# Patient Record
Sex: FEMALE | Race: WHITE | NOT HISPANIC OR LATINO
[De-identification: names, ages, dates, MRNs, and addresses within clinical notes are randomized per-mention and may not be internally consistent; named-entity substitution may affect disease eponyms.]

---

## 2022-03-14 PROBLEM — Z00.00 ENCOUNTER FOR PREVENTIVE HEALTH EXAMINATION: Status: ACTIVE | Noted: 2022-03-14

## 2022-04-27 ENCOUNTER — APPOINTMENT (OUTPATIENT)
Dept: RHEUMATOLOGY | Facility: CLINIC | Age: 27
End: 2022-04-27
Payer: COMMERCIAL

## 2022-04-27 ENCOUNTER — NON-APPOINTMENT (OUTPATIENT)
Age: 27
End: 2022-04-27

## 2022-04-27 VITALS
HEART RATE: 65 BPM | BODY MASS INDEX: 22.58 KG/M2 | HEIGHT: 60 IN | SYSTOLIC BLOOD PRESSURE: 105 MMHG | TEMPERATURE: 97.2 F | DIASTOLIC BLOOD PRESSURE: 71 MMHG | OXYGEN SATURATION: 100 % | WEIGHT: 115 LBS

## 2022-04-27 PROCEDURE — 36415 COLL VENOUS BLD VENIPUNCTURE: CPT

## 2022-04-27 PROCEDURE — 99214 OFFICE O/P EST MOD 30 MIN: CPT | Mod: 25

## 2022-04-27 PROCEDURE — 99204 OFFICE O/P NEW MOD 45 MIN: CPT | Mod: 25

## 2022-04-27 RX ORDER — TIZANIDINE HYDROCHLORIDE 2 MG/1
2 CAPSULE ORAL
Qty: 90 | Refills: 0 | Status: ACTIVE | COMMUNITY
Start: 2022-04-27 | End: 1900-01-01
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
## 2022-04-28 ENCOUNTER — TRANSCRIPTION ENCOUNTER (OUTPATIENT)
Age: 27
End: 2022-04-28

## 2022-04-28 LAB
25(OH)D3 SERPL-MCNC: 23.4 NG/ML
ALBUMIN SERPL ELPH-MCNC: 4.9 G/DL
ALP BLD-CCNC: 62 U/L
ALT SERPL-CCNC: 10 U/L
ANION GAP SERPL CALC-SCNC: 11 MMOL/L
AST SERPL-CCNC: 17 U/L
BASOPHILS # BLD AUTO: 0.07 K/UL
BASOPHILS NFR BLD AUTO: 0.9 %
BILIRUB SERPL-MCNC: 0.3 MG/DL
BUN SERPL-MCNC: 15 MG/DL
C3 SERPL-MCNC: 96 MG/DL
C4 SERPL-MCNC: 26 MG/DL
CALCIUM SERPL-MCNC: 9.2 MG/DL
CHLORIDE SERPL-SCNC: 105 MMOL/L
CO2 SERPL-SCNC: 23 MMOL/L
CREAT SERPL-MCNC: 0.6 MG/DL
CRP SERPL-MCNC: <3 MG/L
EGFR: 125 ML/MIN/1.73M2
ENA RNP AB SER IA-ACNC: <0.2 AL
ENA SM AB SER IA-ACNC: <0.2 AL
ENA SS-A AB SER IA-ACNC: <0.2 AL
ENA SS-B AB SER IA-ACNC: <0.2 AL
EOSINOPHIL # BLD AUTO: 0.13 K/UL
EOSINOPHIL NFR BLD AUTO: 1.6 %
ERYTHROCYTE [SEDIMENTATION RATE] IN BLOOD BY WESTERGREN METHOD: 9 MM/HR
GLUCOSE SERPL-MCNC: 91 MG/DL
HCT VFR BLD CALC: 39.8 %
HETEROPH AB SER QL: NEGATIVE
HGB BLD-MCNC: 12.8 G/DL
IMM GRANULOCYTES NFR BLD AUTO: 0.2 %
LYMPHOCYTES # BLD AUTO: 2.47 K/UL
LYMPHOCYTES NFR BLD AUTO: 30.3 %
MAN DIFF?: NORMAL
MCHC RBC-ENTMCNC: 30.2 PG
MCHC RBC-ENTMCNC: 32.2 GM/DL
MCV RBC AUTO: 93.9 FL
MONOCYTES # BLD AUTO: 0.46 K/UL
MONOCYTES NFR BLD AUTO: 5.7 %
NEUTROPHILS # BLD AUTO: 4.99 K/UL
NEUTROPHILS NFR BLD AUTO: 61.3 %
PLATELET # BLD AUTO: 355 K/UL
POTASSIUM SERPL-SCNC: 4.4 MMOL/L
PROT SERPL-MCNC: 7.1 G/DL
RBC # BLD: 4.24 M/UL
RBC # FLD: 12.7 %
RHEUMATOID FACT SER QL: <10 IU/ML
SODIUM SERPL-SCNC: 140 MMOL/L
THYROGLOB AB SERPL-ACNC: <20 IU/ML
THYROPEROXIDASE AB SERPL IA-ACNC: 2164 IU/ML
TSH SERPL-ACNC: 0.97 UIU/ML
WBC # FLD AUTO: 8.14 K/UL

## 2022-04-29 LAB
CCP AB SER IA-ACNC: <8 UNITS
RF+CCP IGG SER-IMP: NEGATIVE

## 2022-05-02 LAB
A PHAGOCYTOPH IGG TITR SER IF: NORMAL TITER
ANA PAT FLD IF-IMP: ABNORMAL
ANA SER IF-ACNC: ABNORMAL
B BURGDOR AB SER QL IA: NEGATIVE
B MICROTI IGG TITR SER: NORMAL TITER
DSDNA AB SER-ACNC: <12 IU/ML
E CHAFFEENSIS IGG TITR SER IF: NORMAL TITER

## 2022-06-27 ENCOUNTER — APPOINTMENT (OUTPATIENT)
Dept: RHEUMATOLOGY | Facility: CLINIC | Age: 27
End: 2022-06-27

## 2022-06-27 VITALS
DIASTOLIC BLOOD PRESSURE: 65 MMHG | HEART RATE: 75 BPM | BODY MASS INDEX: 22.58 KG/M2 | WEIGHT: 115 LBS | OXYGEN SATURATION: 100 % | HEIGHT: 60 IN | TEMPERATURE: 97.5 F | SYSTOLIC BLOOD PRESSURE: 93 MMHG

## 2022-06-27 DIAGNOSIS — G56.91 UNSPECIFIED MONONEUROPATHY OF RIGHT UPPER LIMB: ICD-10-CM

## 2022-06-27 PROCEDURE — 99213 OFFICE O/P EST LOW 20 MIN: CPT
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
## 2022-12-27 ENCOUNTER — APPOINTMENT (OUTPATIENT)
Dept: RHEUMATOLOGY | Facility: CLINIC | Age: 27
End: 2022-12-27
Payer: COMMERCIAL

## 2022-12-27 VITALS
TEMPERATURE: 97.5 F | WEIGHT: 122 LBS | HEART RATE: 67 BPM | HEIGHT: 60 IN | SYSTOLIC BLOOD PRESSURE: 112 MMHG | OXYGEN SATURATION: 100 % | BODY MASS INDEX: 23.95 KG/M2 | RESPIRATION RATE: 18 BRPM | DIASTOLIC BLOOD PRESSURE: 76 MMHG

## 2022-12-27 DIAGNOSIS — R53.82 CHRONIC FATIGUE, UNSPECIFIED: ICD-10-CM

## 2022-12-27 PROCEDURE — 99214 OFFICE O/P EST MOD 30 MIN: CPT
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
## 2023-05-13 ENCOUNTER — NON-APPOINTMENT (OUTPATIENT)
Age: 28
End: 2023-05-13

## 2023-10-03 ENCOUNTER — NON-APPOINTMENT (OUTPATIENT)
Age: 28
End: 2023-10-03

## 2023-10-23 ENCOUNTER — NON-APPOINTMENT (OUTPATIENT)
Age: 28
End: 2023-10-23

## 2023-10-24 ENCOUNTER — EMERGENCY (EMERGENCY)
Facility: HOSPITAL | Age: 28
LOS: 1 days | Discharge: HOME | End: 2023-10-24
Attending: EMERGENCY MEDICINE
Payer: COMMERCIAL

## 2023-10-24 VITALS
TEMPERATURE: 98 F | HEART RATE: 88 BPM | RESPIRATION RATE: 18 BRPM | SYSTOLIC BLOOD PRESSURE: 110 MMHG | OXYGEN SATURATION: 100 % | DIASTOLIC BLOOD PRESSURE: 76 MMHG | WEIGHT: 139.99 LBS

## 2023-10-24 LAB
ALBUMIN SERPL ELPH-MCNC: 4.2 G/DL — SIGNIFICANT CHANGE UP (ref 3.3–5.2)
ALBUMIN SERPL ELPH-MCNC: 4.2 G/DL — SIGNIFICANT CHANGE UP (ref 3.3–5.2)
ALP SERPL-CCNC: 68 U/L — SIGNIFICANT CHANGE UP (ref 40–120)
ALP SERPL-CCNC: 68 U/L — SIGNIFICANT CHANGE UP (ref 40–120)
ALT FLD-CCNC: 14 U/L — SIGNIFICANT CHANGE UP
ALT FLD-CCNC: 14 U/L — SIGNIFICANT CHANGE UP
ANION GAP SERPL CALC-SCNC: 14 MMOL/L — SIGNIFICANT CHANGE UP (ref 5–17)
ANION GAP SERPL CALC-SCNC: 14 MMOL/L — SIGNIFICANT CHANGE UP (ref 5–17)
APPEARANCE UR: ABNORMAL
APPEARANCE UR: ABNORMAL
AST SERPL-CCNC: 20 U/L — SIGNIFICANT CHANGE UP
AST SERPL-CCNC: 20 U/L — SIGNIFICANT CHANGE UP
BACTERIA # UR AUTO: ABNORMAL
BACTERIA # UR AUTO: ABNORMAL
BASOPHILS # BLD AUTO: 0.04 K/UL — SIGNIFICANT CHANGE UP (ref 0–0.2)
BASOPHILS # BLD AUTO: 0.04 K/UL — SIGNIFICANT CHANGE UP (ref 0–0.2)
BASOPHILS NFR BLD AUTO: 0.4 % — SIGNIFICANT CHANGE UP (ref 0–2)
BASOPHILS NFR BLD AUTO: 0.4 % — SIGNIFICANT CHANGE UP (ref 0–2)
BILIRUB SERPL-MCNC: 0.4 MG/DL — SIGNIFICANT CHANGE UP (ref 0.4–2)
BILIRUB SERPL-MCNC: 0.4 MG/DL — SIGNIFICANT CHANGE UP (ref 0.4–2)
BILIRUB UR-MCNC: NEGATIVE — SIGNIFICANT CHANGE UP
BILIRUB UR-MCNC: NEGATIVE — SIGNIFICANT CHANGE UP
BUN SERPL-MCNC: 13.2 MG/DL — SIGNIFICANT CHANGE UP (ref 8–20)
BUN SERPL-MCNC: 13.2 MG/DL — SIGNIFICANT CHANGE UP (ref 8–20)
CALCIUM SERPL-MCNC: 9 MG/DL — SIGNIFICANT CHANGE UP (ref 8.4–10.5)
CALCIUM SERPL-MCNC: 9 MG/DL — SIGNIFICANT CHANGE UP (ref 8.4–10.5)
CHLORIDE SERPL-SCNC: 101 MMOL/L — SIGNIFICANT CHANGE UP (ref 96–108)
CHLORIDE SERPL-SCNC: 101 MMOL/L — SIGNIFICANT CHANGE UP (ref 96–108)
CO2 SERPL-SCNC: 23 MMOL/L — SIGNIFICANT CHANGE UP (ref 22–29)
CO2 SERPL-SCNC: 23 MMOL/L — SIGNIFICANT CHANGE UP (ref 22–29)
COD CRY URNS QL: ABNORMAL
COD CRY URNS QL: ABNORMAL
COLOR SPEC: YELLOW — SIGNIFICANT CHANGE UP
COLOR SPEC: YELLOW — SIGNIFICANT CHANGE UP
CREAT SERPL-MCNC: 0.72 MG/DL — SIGNIFICANT CHANGE UP (ref 0.5–1.3)
CREAT SERPL-MCNC: 0.72 MG/DL — SIGNIFICANT CHANGE UP (ref 0.5–1.3)
DIFF PNL FLD: NEGATIVE — SIGNIFICANT CHANGE UP
DIFF PNL FLD: NEGATIVE — SIGNIFICANT CHANGE UP
EGFR: 115 ML/MIN/1.73M2 — SIGNIFICANT CHANGE UP
EGFR: 115 ML/MIN/1.73M2 — SIGNIFICANT CHANGE UP
EOSINOPHIL # BLD AUTO: 0.3 K/UL — SIGNIFICANT CHANGE UP (ref 0–0.5)
EOSINOPHIL # BLD AUTO: 0.3 K/UL — SIGNIFICANT CHANGE UP (ref 0–0.5)
EOSINOPHIL NFR BLD AUTO: 3.2 % — SIGNIFICANT CHANGE UP (ref 0–6)
EOSINOPHIL NFR BLD AUTO: 3.2 % — SIGNIFICANT CHANGE UP (ref 0–6)
EPI CELLS # UR: SIGNIFICANT CHANGE UP
EPI CELLS # UR: SIGNIFICANT CHANGE UP
GLUCOSE SERPL-MCNC: 87 MG/DL — SIGNIFICANT CHANGE UP (ref 70–99)
GLUCOSE SERPL-MCNC: 87 MG/DL — SIGNIFICANT CHANGE UP (ref 70–99)
GLUCOSE UR QL: NEGATIVE MG/DL — SIGNIFICANT CHANGE UP
GLUCOSE UR QL: NEGATIVE MG/DL — SIGNIFICANT CHANGE UP
HCG SERPL-ACNC: <4 MIU/ML — SIGNIFICANT CHANGE UP
HCG SERPL-ACNC: <4 MIU/ML — SIGNIFICANT CHANGE UP
HCT VFR BLD CALC: 37.8 % — SIGNIFICANT CHANGE UP (ref 34.5–45)
HCT VFR BLD CALC: 37.8 % — SIGNIFICANT CHANGE UP (ref 34.5–45)
HGB BLD-MCNC: 12.6 G/DL — SIGNIFICANT CHANGE UP (ref 11.5–15.5)
HGB BLD-MCNC: 12.6 G/DL — SIGNIFICANT CHANGE UP (ref 11.5–15.5)
IMM GRANULOCYTES NFR BLD AUTO: 0.2 % — SIGNIFICANT CHANGE UP (ref 0–0.9)
IMM GRANULOCYTES NFR BLD AUTO: 0.2 % — SIGNIFICANT CHANGE UP (ref 0–0.9)
KETONES UR-MCNC: ABNORMAL
KETONES UR-MCNC: ABNORMAL
LEUKOCYTE ESTERASE UR-ACNC: ABNORMAL
LEUKOCYTE ESTERASE UR-ACNC: ABNORMAL
LIDOCAIN IGE QN: 39 U/L — SIGNIFICANT CHANGE UP (ref 22–51)
LIDOCAIN IGE QN: 39 U/L — SIGNIFICANT CHANGE UP (ref 22–51)
LYMPHOCYTES # BLD AUTO: 2.84 K/UL — SIGNIFICANT CHANGE UP (ref 1–3.3)
LYMPHOCYTES # BLD AUTO: 2.84 K/UL — SIGNIFICANT CHANGE UP (ref 1–3.3)
LYMPHOCYTES # BLD AUTO: 30.7 % — SIGNIFICANT CHANGE UP (ref 13–44)
LYMPHOCYTES # BLD AUTO: 30.7 % — SIGNIFICANT CHANGE UP (ref 13–44)
MCHC RBC-ENTMCNC: 31 PG — SIGNIFICANT CHANGE UP (ref 27–34)
MCHC RBC-ENTMCNC: 31 PG — SIGNIFICANT CHANGE UP (ref 27–34)
MCHC RBC-ENTMCNC: 33.3 GM/DL — SIGNIFICANT CHANGE UP (ref 32–36)
MCHC RBC-ENTMCNC: 33.3 GM/DL — SIGNIFICANT CHANGE UP (ref 32–36)
MCV RBC AUTO: 93.1 FL — SIGNIFICANT CHANGE UP (ref 80–100)
MCV RBC AUTO: 93.1 FL — SIGNIFICANT CHANGE UP (ref 80–100)
MONOCYTES # BLD AUTO: 0.54 K/UL — SIGNIFICANT CHANGE UP (ref 0–0.9)
MONOCYTES # BLD AUTO: 0.54 K/UL — SIGNIFICANT CHANGE UP (ref 0–0.9)
MONOCYTES NFR BLD AUTO: 5.8 % — SIGNIFICANT CHANGE UP (ref 2–14)
MONOCYTES NFR BLD AUTO: 5.8 % — SIGNIFICANT CHANGE UP (ref 2–14)
NEUTROPHILS # BLD AUTO: 5.51 K/UL — SIGNIFICANT CHANGE UP (ref 1.8–7.4)
NEUTROPHILS # BLD AUTO: 5.51 K/UL — SIGNIFICANT CHANGE UP (ref 1.8–7.4)
NEUTROPHILS NFR BLD AUTO: 59.7 % — SIGNIFICANT CHANGE UP (ref 43–77)
NEUTROPHILS NFR BLD AUTO: 59.7 % — SIGNIFICANT CHANGE UP (ref 43–77)
NITRITE UR-MCNC: NEGATIVE — SIGNIFICANT CHANGE UP
NITRITE UR-MCNC: NEGATIVE — SIGNIFICANT CHANGE UP
PH UR: 6 — SIGNIFICANT CHANGE UP (ref 5–8)
PH UR: 6 — SIGNIFICANT CHANGE UP (ref 5–8)
PLATELET # BLD AUTO: 340 K/UL — SIGNIFICANT CHANGE UP (ref 150–400)
PLATELET # BLD AUTO: 340 K/UL — SIGNIFICANT CHANGE UP (ref 150–400)
POTASSIUM SERPL-MCNC: 4.2 MMOL/L — SIGNIFICANT CHANGE UP (ref 3.5–5.3)
POTASSIUM SERPL-MCNC: 4.2 MMOL/L — SIGNIFICANT CHANGE UP (ref 3.5–5.3)
POTASSIUM SERPL-SCNC: 4.2 MMOL/L — SIGNIFICANT CHANGE UP (ref 3.5–5.3)
POTASSIUM SERPL-SCNC: 4.2 MMOL/L — SIGNIFICANT CHANGE UP (ref 3.5–5.3)
PROT SERPL-MCNC: 6.7 G/DL — SIGNIFICANT CHANGE UP (ref 6.6–8.7)
PROT SERPL-MCNC: 6.7 G/DL — SIGNIFICANT CHANGE UP (ref 6.6–8.7)
PROT UR-MCNC: 30 MG/DL
PROT UR-MCNC: 30 MG/DL
RBC # BLD: 4.06 M/UL — SIGNIFICANT CHANGE UP (ref 3.8–5.2)
RBC # BLD: 4.06 M/UL — SIGNIFICANT CHANGE UP (ref 3.8–5.2)
RBC # FLD: 11.8 % — SIGNIFICANT CHANGE UP (ref 10.3–14.5)
RBC # FLD: 11.8 % — SIGNIFICANT CHANGE UP (ref 10.3–14.5)
RBC CASTS # UR COMP ASSIST: SIGNIFICANT CHANGE UP /HPF (ref 0–4)
RBC CASTS # UR COMP ASSIST: SIGNIFICANT CHANGE UP /HPF (ref 0–4)
SODIUM SERPL-SCNC: 138 MMOL/L — SIGNIFICANT CHANGE UP (ref 135–145)
SODIUM SERPL-SCNC: 138 MMOL/L — SIGNIFICANT CHANGE UP (ref 135–145)
SP GR SPEC: 1.02 — SIGNIFICANT CHANGE UP (ref 1.01–1.02)
SP GR SPEC: 1.02 — SIGNIFICANT CHANGE UP (ref 1.01–1.02)
UROBILINOGEN FLD QL: 1 MG/DL
UROBILINOGEN FLD QL: 1 MG/DL
WBC # BLD: 9.25 K/UL — SIGNIFICANT CHANGE UP (ref 3.8–10.5)
WBC # BLD: 9.25 K/UL — SIGNIFICANT CHANGE UP (ref 3.8–10.5)
WBC # FLD AUTO: 9.25 K/UL — SIGNIFICANT CHANGE UP (ref 3.8–10.5)
WBC # FLD AUTO: 9.25 K/UL — SIGNIFICANT CHANGE UP (ref 3.8–10.5)
WBC UR QL: SIGNIFICANT CHANGE UP /HPF (ref 0–5)
WBC UR QL: SIGNIFICANT CHANGE UP /HPF (ref 0–5)

## 2023-10-24 PROCEDURE — 99285 EMERGENCY DEPT VISIT HI MDM: CPT

## 2023-10-24 PROCEDURE — 76705 ECHO EXAM OF ABDOMEN: CPT | Mod: 26

## 2023-10-24 NOTE — ED PROVIDER NOTE - TEMPLATE, MLM
General Spironolactone Counseling: Patient advised regarding risks of diarrhea, abdominal pain, hyperkalemia, birth defects (for female patients), liver toxicity and renal toxicity. The patient may need blood work to monitor liver and kidney function and potassium levels while on therapy. The patient verbalized understanding of the proper use and possible adverse effects of spironolactone.  All of the patient's questions and concerns were addressed.

## 2023-10-24 NOTE — ED PROVIDER NOTE - PHYSICAL EXAMINATION
Gen: Nontoxic, well appearing, in NAD.  Skin: Warm and dry as visualized.  Head: NC/AT.  Eyes: PERRLA. EOMI.  Neck: Supple, FROM. Trachea midline.   Resp: No distress.  Cardio: Well perfused.  Abd: Nondistended. Soft. RUQ/RLQ tenderness. No guarding.   Ext: No deformities. MAEx4. FROM.   Neuro: A&Ox3. Appears nonfocal.   Psych: Normal affect and mood.

## 2023-10-24 NOTE — ED PROVIDER NOTE - PATIENT PORTAL LINK FT
You can access the FollowMyHealth Patient Portal offered by [facility] by registering at the following website: [email or web address]. By joining [employer]’s FollowMyHealth portal, you will also be able to view your health information using other applications (apps) compatible with our system.

## 2023-10-24 NOTE — ED PROVIDER NOTE - PROGRESS NOTE DETAILS
JEAN Blankenship: Patient evaluated by intake physician. HPI/ROS/PE as noted above. Will follow up plan per intake physician and continue to assess patient.

## 2023-10-24 NOTE — ED PROVIDER NOTE - OBJECTIVE STATEMENT
29 yo female PMHx Hashimoto's, hemochromatosis, Gluten intolerant presents to ED c/o multiple medical complaints. Reports over the last 2 weeks waking up with upper abdominal pain and after heavy meals. Reports loss of appetite. Intermittent diarrhea/constipation. Presented to UC today, instructed to present to ED. Mother had GERD. No further complaints at this time.   Denies vomiting, urinary sxms.

## 2023-10-24 NOTE — ED ADULT TRIAGE NOTE - MODE OF ARRIVAL
Walk in If you are a smoker, it is important for your health to stop smoking. Please be aware that second hand smoke is also harmful.

## 2023-10-24 NOTE — ED ADULT NURSE NOTE - OBJECTIVE STATEMENT
Assumed care of patient in consult a3. Pt a&ox4 rr even and unlabored with no pmhx presents to ed with one week of "on and off" abdominal pain/cramping, diarrhea and constipation. Pt denies chest pain, sob, fever, chills, numbness/tingling. pt educated on plan of care, pt able to successfully teach back plan of care to RN, RN will continue to reeducate pt during hospital stay.

## 2023-10-24 NOTE — ED PROVIDER NOTE - NSFOLLOWUPINSTRUCTIONS_ED_ALL_ED_FT
- Increase fluids.  - [place] diet as tolerated. Avoid citrus based food/drink, spicy/greasy foods, milk, caffeine.   - Please call today to schedule follow up appointment with gastroenterologist.     - If you need assistance in scheduling a follow up appointment, please call the bolded telephone number on the first page of your discharge instructions.   - Please call to schedule follow up appointment with your primary care physician within 24-48 hours.  - Please seek immediate medical attention for any new/worsening, signs/symptoms, or concerns.    Feel better!      Acute Abdominal Pain    WHAT YOU NEED TO KNOW:    What do I need to know about acute abdominal pain? Acute abdominal pain usually starts suddenly and gets worse quickly.     What are minor causes of acute abdominal pain?   •An allergic reaction to food, or food poisoning      •Stress      •Acid reflux      •Constipation      •Monthly period pain in females      What are serious causes of acute abdominal pain?   •Inflammation or rupture of your appendix      •Swelling or an infection in your abdomen or organ      •A blockage in your bowels      •An ulcer or a tear in your esophagus, stomach, or intestines      •Bleeding in your abdomen or an organ      •Stones in your kidney or gallbladder      •Diseases of the fallopian tubes or ovaries      •An ectopic pregnancy      How is the cause of acute abdominal pain diagnosed? Your healthcare provider will ask about your signs and symptoms. Tell the provider when your symptoms started and about any recent travel or surgery. Also tell him what makes the pain better or worse, and what treatments you have tried. The provider will examine you. Based on what your provider finds from the exam, and your symptoms, you may need other tests. Examples include blood or urine tests, an ultrasound, a CT scan, or an endoscopy.     How is acute abdominal pain treated? Treatment may depend on the cause of your abdominal pain. You may need any of the following:   •Medicines may be given to decrease pain, treat an infection, and manage your symptoms, such as constipation.       •Surgery may be needed to treat a serious cause of abdominal pain. Examples include surgery to treat appendicitis or a blockage in your bowels.       How can I manage my symptoms?   •Apply heat on your abdomen for 20 to 30 minutes every 2 hours for as many days as directed. Heat helps decrease pain and muscle spasms.       •Make changes to the food you eat as directed. Do not eat foods that cause abdominal pain or other symptoms. Eat small meals more often. ?Eat more high-fiber foods if you are constipated. High-fiber foods include fruits, vegetables, whole-grain foods, and legumes.       ?Do not eat foods that cause gas if you have bloating. Examples include broccoli, cabbage, and cauliflower. Do not drink soda or carbonated drinks, because these may also cause gas.       ?Do not eat foods or drinks that contain sorbitol or fructose if you have diarrhea and bloating. Some examples are fruit juices, candy, jelly, and sugar-free gum.       ?Do not eat high-fat foods, such as fried foods, cheeseburgers, hot dogs, and desserts.      ?Limit or do not drink caffeine. Caffeine may make symptoms, such as heart burn or nausea, worse.       ?Drink plenty of liquids to prevent dehydration from diarrhea or vomiting. Ask your healthcare provider how much liquid to drink each day and which liquids are best for you.       •Manage your stress. Stress may cause abdominal pain. Your healthcare provider may recommend relaxation techniques and deep breathing exercises to help decrease your stress. Your healthcare provider may recommend you talk to someone about your stress or anxiety, such as a counselor or a trusted friend. Get plenty of sleep and exercise regularly.       •Limit or do not drink alcohol. Alcohol can make your abdominal pain worse. Ask your healthcare provider if it is safe for you to drink alcohol. Also ask how much is safe for you to drink.       •Do not smoke. Nicotine and other chemicals in cigarettes can damage your esophagus and stomach. Ask your healthcare provider for information if you currently smoke and need help to quit. E-cigarettes or smokeless tobacco still contain nicotine. Talk to your healthcare provider before you use these products.       When should I seek immediate care?   •You vomit blood or cannot stop vomiting.      •You have blood in your bowel movement or it looks like tar.       •You have bleeding from your rectum.       •Your abdomen is larger than usual, more painful, and hard.       •You have severe pain in your abdomen.       •You stop passing gas and having bowel movements.       •You feel weak, dizzy, or faint.      When should I contact my healthcare provider?   •You have a fever.      •You have new signs and symptoms.      •Your symptoms do not get better with treatment.       •You have questions or concerns about your condition or care.      CARE AGREEMENT:    You have the right to help plan your care. Learn about your health condition and how it may be treated. Discuss treatment options with your healthcare providers to decide what care you want to receive. You always have the right to refuse treatment.

## 2023-10-24 NOTE — ED PROVIDER NOTE - NS ED ATTENDING STATEMENT MOD
This was a shared visit with the IAN. I reviewed and verified the documentation and independently performed the documented:

## 2023-10-24 NOTE — ED PROVIDER NOTE - CARE PROVIDER_API CALL
Nicholas Castillo  Gastroenterology  [address]  [place] [address]  Phone: [phone]  Fax: [phone]  Follow Up Time:

## 2023-10-24 NOTE — ED PROVIDER NOTE - CLINICAL SUMMARY MEDICAL DECISION MAKING FREE TEXT BOX
27 yo female PMHx Hashimoto's, hemochromatosis, Gluten intolerant presents to ED c/o abdominal pain, intermittent diarrhea/constipation. No abnormalities on labs. No concerning findings on US/CU. Encouraged GI f/u. Medically stable for discharge.

## 2023-10-24 NOTE — ED PROVIDER NOTE - CARE PROVIDERS DIRECT ADDRESSES
[email or web address]

## 2023-10-24 NOTE — ED PROVIDER NOTE - NSPTACCESSSVCSAPPT_ED_ALL_ED
Cardiac clearance letter faxed and scanned into chart. Called pt to hold ASA 7 days prior to procedure and to resume as soon as safely possible . Pt v/u. Specialty Care (Routine)...

## 2023-10-25 VITALS
TEMPERATURE: 98 F | RESPIRATION RATE: 18 BRPM | SYSTOLIC BLOOD PRESSURE: 105 MMHG | OXYGEN SATURATION: 98 % | HEART RATE: 90 BPM | DIASTOLIC BLOOD PRESSURE: 71 MMHG

## 2023-10-25 PROCEDURE — 74177 CT ABD & PELVIS W/CONTRAST: CPT | Mod: MA

## 2023-10-25 PROCEDURE — 84702 CHORIONIC GONADOTROPIN TEST: CPT

## 2023-10-25 PROCEDURE — 85025 COMPLETE CBC W/AUTO DIFF WBC: CPT

## 2023-10-25 PROCEDURE — 83690 ASSAY OF LIPASE: CPT

## 2023-10-25 PROCEDURE — 81001 URINALYSIS AUTO W/SCOPE: CPT

## 2023-10-25 PROCEDURE — 87086 URINE CULTURE/COLONY COUNT: CPT

## 2023-10-25 PROCEDURE — 80053 COMPREHEN METABOLIC PANEL: CPT

## 2023-10-25 PROCEDURE — 76705 ECHO EXAM OF ABDOMEN: CPT

## 2023-10-25 PROCEDURE — 74177 CT ABD & PELVIS W/CONTRAST: CPT | Mod: 26,MA

## 2023-10-25 PROCEDURE — 99284 EMERGENCY DEPT VISIT MOD MDM: CPT | Mod: 25

## 2023-10-25 PROCEDURE — 36415 COLL VENOUS BLD VENIPUNCTURE: CPT

## 2023-10-26 LAB
CULTURE RESULTS: SIGNIFICANT CHANGE UP
CULTURE RESULTS: SIGNIFICANT CHANGE UP
SPECIMEN SOURCE: SIGNIFICANT CHANGE UP
SPECIMEN SOURCE: SIGNIFICANT CHANGE UP

## 2023-10-28 ENCOUNTER — NON-APPOINTMENT (OUTPATIENT)
Age: 28
End: 2023-10-28

## 2023-12-17 ENCOUNTER — NON-APPOINTMENT (OUTPATIENT)
Age: 28
End: 2023-12-17

## 2024-01-31 ENCOUNTER — APPOINTMENT (OUTPATIENT)
Dept: GASTROENTEROLOGY | Facility: CLINIC | Age: 29
End: 2024-01-31

## 2024-05-10 DIAGNOSIS — G89.29 OTHER CHRONIC PAIN: ICD-10-CM

## 2025-04-10 ENCOUNTER — EMERGENCY (EMERGENCY)
Facility: HOSPITAL | Age: 30
LOS: 1 days | End: 2025-04-10
Attending: EMERGENCY MEDICINE
Payer: COMMERCIAL

## 2025-04-10 VITALS
HEART RATE: 81 BPM | SYSTOLIC BLOOD PRESSURE: 133 MMHG | DIASTOLIC BLOOD PRESSURE: 86 MMHG | HEIGHT: 64 IN | OXYGEN SATURATION: 100 % | TEMPERATURE: 97 F | WEIGHT: 143.96 LBS | RESPIRATION RATE: 20 BRPM

## 2025-04-10 VITALS
SYSTOLIC BLOOD PRESSURE: 103 MMHG | DIASTOLIC BLOOD PRESSURE: 60 MMHG | TEMPERATURE: 98 F | RESPIRATION RATE: 18 BRPM | OXYGEN SATURATION: 98 % | HEART RATE: 85 BPM

## 2025-04-10 LAB
ALBUMIN SERPL ELPH-MCNC: 4.1 G/DL — SIGNIFICANT CHANGE UP (ref 3.3–5.2)
ALP SERPL-CCNC: 78 U/L — SIGNIFICANT CHANGE UP (ref 40–120)
ALT FLD-CCNC: 9 U/L — SIGNIFICANT CHANGE UP
ANION GAP SERPL CALC-SCNC: 14 MMOL/L — SIGNIFICANT CHANGE UP (ref 5–17)
APPEARANCE UR: CLEAR — SIGNIFICANT CHANGE UP
AST SERPL-CCNC: 13 U/L — SIGNIFICANT CHANGE UP
BACTERIA # UR AUTO: ABNORMAL /HPF
BASOPHILS # BLD AUTO: 0.05 K/UL — SIGNIFICANT CHANGE UP (ref 0–0.2)
BASOPHILS NFR BLD AUTO: 0.3 % — SIGNIFICANT CHANGE UP (ref 0–2)
BILIRUB SERPL-MCNC: <0.2 MG/DL — LOW (ref 0.4–2)
BILIRUB UR-MCNC: NEGATIVE — SIGNIFICANT CHANGE UP
BLD GP AB SCN SERPL QL: SIGNIFICANT CHANGE UP
BUN SERPL-MCNC: 11.1 MG/DL — SIGNIFICANT CHANGE UP (ref 8–20)
CALCIUM SERPL-MCNC: 9 MG/DL — SIGNIFICANT CHANGE UP (ref 8.4–10.5)
CAST: 1 /LPF — SIGNIFICANT CHANGE UP (ref 0–4)
CHLORIDE SERPL-SCNC: 102 MMOL/L — SIGNIFICANT CHANGE UP (ref 96–108)
CO2 SERPL-SCNC: 22 MMOL/L — SIGNIFICANT CHANGE UP (ref 22–29)
COD CRY URNS QL: PRESENT
COLOR SPEC: YELLOW — SIGNIFICANT CHANGE UP
CREAT SERPL-MCNC: 0.67 MG/DL — SIGNIFICANT CHANGE UP (ref 0.5–1.3)
DIFF PNL FLD: NEGATIVE — SIGNIFICANT CHANGE UP
EGFR: 120 ML/MIN/1.73M2 — SIGNIFICANT CHANGE UP
EGFR: 120 ML/MIN/1.73M2 — SIGNIFICANT CHANGE UP
EOSINOPHIL # BLD AUTO: 0.04 K/UL — SIGNIFICANT CHANGE UP (ref 0–0.5)
EOSINOPHIL NFR BLD AUTO: 0.3 % — SIGNIFICANT CHANGE UP (ref 0–6)
GLUCOSE SERPL-MCNC: 109 MG/DL — HIGH (ref 70–99)
GLUCOSE UR QL: NEGATIVE MG/DL — SIGNIFICANT CHANGE UP
HCG SERPL-ACNC: HIGH MIU/ML
HCT VFR BLD CALC: 38.6 % — SIGNIFICANT CHANGE UP (ref 34.5–45)
HGB BLD-MCNC: 13.1 G/DL — SIGNIFICANT CHANGE UP (ref 11.5–15.5)
IMM GRANULOCYTES # BLD AUTO: 0.06 K/UL — SIGNIFICANT CHANGE UP (ref 0–0.07)
IMM GRANULOCYTES NFR BLD AUTO: 0.4 % — SIGNIFICANT CHANGE UP (ref 0–0.9)
KETONES UR-MCNC: ABNORMAL MG/DL
LEUKOCYTE ESTERASE UR-ACNC: NEGATIVE — SIGNIFICANT CHANGE UP
LYMPHOCYTES # BLD AUTO: 1.77 K/UL — SIGNIFICANT CHANGE UP (ref 1–3.3)
LYMPHOCYTES NFR BLD AUTO: 11.3 % — LOW (ref 13–44)
MCHC RBC-ENTMCNC: 31.2 PG — SIGNIFICANT CHANGE UP (ref 27–34)
MCHC RBC-ENTMCNC: 33.9 G/DL — SIGNIFICANT CHANGE UP (ref 32–36)
MCV RBC AUTO: 91.9 FL — SIGNIFICANT CHANGE UP (ref 80–100)
MONOCYTES # BLD AUTO: 0.69 K/UL — SIGNIFICANT CHANGE UP (ref 0–0.9)
MONOCYTES NFR BLD AUTO: 4.4 % — SIGNIFICANT CHANGE UP (ref 2–14)
NEUTROPHILS # BLD AUTO: 13.09 K/UL — HIGH (ref 1.8–7.4)
NEUTROPHILS NFR BLD AUTO: 83.3 % — HIGH (ref 43–77)
NITRITE UR-MCNC: NEGATIVE — SIGNIFICANT CHANGE UP
NRBC # BLD AUTO: 0 K/UL — SIGNIFICANT CHANGE UP (ref 0–0)
NRBC # FLD: 0 K/UL — SIGNIFICANT CHANGE UP (ref 0–0)
NRBC BLD AUTO-RTO: 0 /100 WBCS — SIGNIFICANT CHANGE UP (ref 0–0)
PH UR: 5.5 — SIGNIFICANT CHANGE UP (ref 5–8)
PLATELET # BLD AUTO: 331 K/UL — SIGNIFICANT CHANGE UP (ref 150–400)
PMV BLD: 10.8 FL — SIGNIFICANT CHANGE UP (ref 7–13)
POTASSIUM SERPL-MCNC: 3.7 MMOL/L — SIGNIFICANT CHANGE UP (ref 3.5–5.3)
POTASSIUM SERPL-SCNC: 3.7 MMOL/L — SIGNIFICANT CHANGE UP (ref 3.5–5.3)
PROT SERPL-MCNC: 7 G/DL — SIGNIFICANT CHANGE UP (ref 6.6–8.7)
PROT UR-MCNC: SIGNIFICANT CHANGE UP MG/DL
RBC # BLD: 4.2 M/UL — SIGNIFICANT CHANGE UP (ref 3.8–5.2)
RBC # FLD: 12.2 % — SIGNIFICANT CHANGE UP (ref 10.3–14.5)
RBC CASTS # UR COMP ASSIST: 3 /HPF — SIGNIFICANT CHANGE UP (ref 0–4)
SODIUM SERPL-SCNC: 138 MMOL/L — SIGNIFICANT CHANGE UP (ref 135–145)
SP GR SPEC: >1.03 — HIGH (ref 1–1.03)
SQUAMOUS # UR AUTO: 7 /HPF — HIGH (ref 0–5)
UROBILINOGEN FLD QL: 1 MG/DL — SIGNIFICANT CHANGE UP (ref 0.2–1)
WBC # BLD: 15.7 K/UL — HIGH (ref 3.8–10.5)
WBC # FLD AUTO: 15.7 K/UL — HIGH (ref 3.8–10.5)
WBC UR QL: 6 /HPF — HIGH (ref 0–5)

## 2025-04-10 PROCEDURE — 76817 TRANSVAGINAL US OBSTETRIC: CPT | Mod: 26

## 2025-04-10 PROCEDURE — 76801 OB US < 14 WKS SINGLE FETUS: CPT | Mod: 26

## 2025-04-10 PROCEDURE — 99285 EMERGENCY DEPT VISIT HI MDM: CPT

## 2025-04-10 RX ORDER — ACETAMINOPHEN 500 MG/5ML
1000 LIQUID (ML) ORAL ONCE
Refills: 0 | Status: COMPLETED | OUTPATIENT
Start: 2025-04-10 | End: 2025-04-10

## 2025-04-10 RX ORDER — KETOROLAC TROMETHAMINE 30 MG/ML
15 INJECTION, SOLUTION INTRAMUSCULAR; INTRAVENOUS ONCE
Refills: 0 | Status: DISCONTINUED | OUTPATIENT
Start: 2025-04-10 | End: 2025-04-10

## 2025-04-10 RX ORDER — ONDANSETRON HCL/PF 4 MG/2 ML
4 VIAL (ML) INJECTION ONCE
Refills: 0 | Status: ACTIVE | OUTPATIENT
Start: 2025-04-10 | End: 2025-04-10

## 2025-04-10 RX ADMIN — Medication 400 MILLIGRAM(S): at 20:54

## 2025-04-10 RX ADMIN — KETOROLAC TROMETHAMINE 15 MILLIGRAM(S): 30 INJECTION, SOLUTION INTRAMUSCULAR; INTRAVENOUS at 22:43

## 2025-04-10 NOTE — ED PROVIDER NOTE - ATTENDING APP SHARED VISIT CONTRIBUTION OF CARE
indep evalpregnant approx 2 months with unilateral pelvic pain  +ttp on pe  needs lab ua US   eval for possible ectopic  agree w evla an dmngt

## 2025-04-10 NOTE — ED ADULT NURSE NOTE - OBJECTIVE STATEMENT
Pt A&Ox4 c/o pelvic pain R sided sometimes b/l x today. + pregnancy test 2 days ago appt to see OB tomorrow. LMP 2/13 reports had some implantation bleeding, none now. Hx Hashimoto's on synthroid, upped dose by endocrinologist. Airway patent, respirations even and unlabored.

## 2025-04-10 NOTE — ED PROVIDER NOTE - PHYSICAL EXAMINATION
General: non-toxic appearing, in some acute discomfort, A+O  CV: RRR, nl s1/s2.  Resp: CTAB, normal rate and effort  GI: Abdomen soft, NT, ND  Skin: No rashes. intact and perfused.

## 2025-04-10 NOTE — ED PROVIDER NOTE - PROGRESS NOTE DETAILS
transported pt down to US at 0810pm received call from rad tech noting that US shows likely ectopic, pending official rad reading

## 2025-04-10 NOTE — ED PROVIDER NOTE - OBJECTIVE STATEMENT
30yo F  10w gest (LMP b 16), med hx of anxiety, hemochromatosis, hyperthyroidism, presents to ED c/o RLQ pain. pt notes she has been experiencing pain over last few days, however pain became more severe and more constant over last 3h. pt also reports nausea, which she has been experiencing throughout her pregnancy. has not taken meds for sx. pt notes he mother has hx of multiple miscarriages. no vaginal bleeding, vaginal discharge or leakage, vomiting, back pain, dysuria, hematuria, urinary freq/urgency

## 2025-04-10 NOTE — ED PROVIDER NOTE - PATIENT PORTAL LINK FT
You can access the FollowMyHealth Patient Portal offered by [facility] by registering at the following website: [email or web address]. By joining [employer]’s FollowMyHealth portal, you will also be able to view your health information using other applications (apps) compatible with our system.

## 2025-04-10 NOTE — ED PROVIDER NOTE - CLINICAL SUMMARY MEDICAL DECISION MAKING FREE TEXT BOX
28yo F 10w gest p/w progressive unilateral lower abdominal pain. no vaginal bleeding or leakage. on eval, pt appeared well, in some discomfort, abdomen benign, remainder of PE WNL. VSS. pt received priority US to r/o ectopic. pending rad reading, labs results, and urine results. ordered ofirmev for pain. called OB to advised likely ectopic 30yo F 10w gest p/w progressive unilateral lower abdominal pain. no vaginal bleeding or leakage. on eval, pt appeared well, in some discomfort, abdomen benign, remainder of PE WNL. VSS. pt received priority US to r/o ectopic. pending rad reading, labs results, and urine results. ordered ofirmev for pain. called OB to advised likely ectopic    US showed high suspicion for R tubal ectopic unruptured. HCG 12k. pt was evaluated by OB team. methotrexate ordered by OBGYN team. advised to f/u with ob. discussed supportive care measures and return precautions. pt verbalized understanding and agreement

## 2025-04-11 PROCEDURE — 80053 COMPREHEN METABOLIC PANEL: CPT

## 2025-04-11 PROCEDURE — 81001 URINALYSIS AUTO W/SCOPE: CPT

## 2025-04-11 PROCEDURE — 87086 URINE CULTURE/COLONY COUNT: CPT

## 2025-04-11 PROCEDURE — 86850 RBC ANTIBODY SCREEN: CPT

## 2025-04-11 PROCEDURE — 76817 TRANSVAGINAL US OBSTETRIC: CPT

## 2025-04-11 PROCEDURE — 76801 OB US < 14 WKS SINGLE FETUS: CPT

## 2025-04-11 PROCEDURE — 99284 EMERGENCY DEPT VISIT MOD MDM: CPT

## 2025-04-11 PROCEDURE — 86901 BLOOD TYPING SEROLOGIC RH(D): CPT

## 2025-04-11 PROCEDURE — 99284 EMERGENCY DEPT VISIT MOD MDM: CPT | Mod: 25

## 2025-04-11 PROCEDURE — 86900 BLOOD TYPING SEROLOGIC ABO: CPT

## 2025-04-11 PROCEDURE — 96375 TX/PRO/DX INJ NEW DRUG ADDON: CPT

## 2025-04-11 PROCEDURE — 84702 CHORIONIC GONADOTROPIN TEST: CPT

## 2025-04-11 PROCEDURE — 36415 COLL VENOUS BLD VENIPUNCTURE: CPT

## 2025-04-11 PROCEDURE — 96374 THER/PROPH/DIAG INJ IV PUSH: CPT

## 2025-04-11 PROCEDURE — 96372 THER/PROPH/DIAG INJ SC/IM: CPT | Mod: XU

## 2025-04-11 PROCEDURE — 85025 COMPLETE CBC W/AUTO DIFF WBC: CPT

## 2025-04-11 RX ORDER — METHOTREXATE 25 MG/ML
80 INJECTION, SOLUTION INTRA-ARTERIAL; INTRAMUSCULAR; INTRATHECAL; INTRAVENOUS ONCE
Refills: 0 | Status: COMPLETED | OUTPATIENT
Start: 2025-04-11 | End: 2025-04-11

## 2025-04-11 RX ORDER — METHOTREXATE 25 MG/ML
80 INJECTION, SOLUTION INTRA-ARTERIAL; INTRAMUSCULAR; INTRATHECAL; INTRAVENOUS
Refills: 0 | Status: DISCONTINUED | OUTPATIENT
Start: 2025-04-11 | End: 2025-04-11

## 2025-04-11 RX ADMIN — METHOTREXATE 80 MILLIGRAM(S): 25 INJECTION, SOLUTION INTRA-ARTERIAL; INTRAMUSCULAR; INTRATHECAL; INTRAVENOUS at 01:56

## 2025-04-11 NOTE — CONSULT NOTE ADULT - SUBJECTIVE AND OBJECTIVE BOX
RAMON MIRANDA is a 29y  at 3w2d who presented to the ED with a three day hx of abd cramping. She states that she took at-home pregnancy test which came back positive. The cramping kept increasing in severity until she  felt the need to come in to the ED today for pain control. She denies any vaginal bleeding, abnormal discharge or urinary sx. Patient endorsing mild nausea but no vomiting. She denies any other GI symptoms. No urinary symptoms. TVUS performed which confirmed the presence of a right tubal ectopic containing a gestational sac with no evidence of rupture at this time. No IUP present. Incidental finding of a left 3.9 cm hemorrhagic ovarian cyst. She is not currently endorsing any pain s/p Zofran, Ofirmev and Toradol given in the ED.     GYN provider: Dr Guerrier    LMP: 3/19/2025    POBHx:   PGYNHx: Denies uterine fibroids, STIs. Endorses hx of a left ovarian cyst that was roughly 2cm in diameter two years ago. Hx of abn PAPs but no hx of colpo or LEEP.   PMHx: Anxiety, Hashimoto's Thyroiditis, Asthma, Hereditary hemochromatosis  Meds: Lexapro, Leiothyronine, Levothyroxine, Singulair  All: Duricef - hives  PSHx: Tonsillectomy, Adenoidectomy  Social Hx: Denies    Physical Exam  T(C): 36.8 (04-10-25 @ 23:42), Max: 36.8 (04-10-25 @ 23:42)  HR: 85 (04-10-25 @ 23:42) (81 - 85)  BP: 103/60 (04-10-25 @ 23:42) (103/60 - 133/86)  RR: 18 (04-10-25 @ 23:42) (18 - 20)  SpO2: 98% (04-10-25 @ 23:42) (98% - 100%)  Gen: AAOx3, NAD  CVS: RRR, S1 and S2 noted.   Resp: CTA b/l  Abd: Nontender, nondistended.  No guarding, no rebound tenderness. No CVA tenderness  Ext: No swelling, redness or tenderness in the UE or LE b/l.   Pelvic: Deferred. No vaginal bleeding or abnormal vaginal discharge. Normal external genitalia.         Labs:                        13.1   15.70 )-----------( 331      ( 10 Apr 2025 20:55 )             38.6     04-10    138  |  102  |  11.1  ----------------------------<  109[H]  3.7   |  22.0  |  0.67    Ca    9.0      10 Apr 2025 20:55    TPro  7.0  /  Alb  4.1  /  TBili  <0.2[L]  /  DBili  x   /  AST  13  /  ALT  9   /  AlkPhos  78  04-10    HCG Quantitative, Serum: 43367.0 mIU/mL (04-10-25 @ 20:55)      Imaging:     LMP: 2025    Estimated Gestational Age by LMP: 7 weeks 4 days    COMPARISON: CT abdomen and pelvis dated 10/25/2023.    TECHNIQUE: Endovaginal and transabdominal pelvic sonogram. Color and   Spectral Doppler was performed.    FINDINGS:  Uterus: The uterus is unremarkable in appearance. The endometrium is   mildly heterogeneous measuring approximately 1.2 cm in AP thickness   without demonstrable intrauterine gestation.    Right ovary: 3.2 cm x 1.6 cm x 2.3 cm. Within normal limits. Normal   arterial and venous waveforms.  Left ovary: 3.9 cm x 3.9 cm x 4.0 cm. Within normal limits. The left   ovary contains a 3.4 x 3.6 x 3.9 cm hemorrhagic cyst with lacelike   internal septations.    Adnexa: There is a 2.2 x 2.3 x 2.3cm thick-walled echogenic structure   with central fluid adjacent to the right ovary with demonstrable   vascularity on spectral Doppler within the thick-walled component.    Fluid: Small line simple appearing free fluid posterior cul-de-sac.    IMPRESSION:  1. Findings highly suspicious for a right tubal ectopic pregnancy   containing a gestational sac. No evidence of rupture at this time.  2. No intrauterine gestation identified.  3. Incidental 3.9 cm hemorrhagic left ovarian cyst.

## 2025-04-11 NOTE — CONSULT NOTE ADULT - ASSESSMENT
A/P: RAMON MIRANDA is a 29y  at 3w2d who presented to the ED with a three day hx of abd cramping that worsened in severity. S/p Toradol, Ofirmev and Zofran in the ED with resolution of pain.     -VSS  -Abd: soft, non-tender.   -H/H: 13.1/38.6   -TVUS: 1. Findings highly suspicious for a 2.2 x 2.3 x 2.3cm right tubal ectopic pregnancy   containing a gestational sac. No evidence of rupture at this time.  2. No intrauterine gestation identified.  3. Incidental 3.9 cm hemorrhagic left ovarian cyst.  -[facility] [place]  -Blood type: AB negative     The patient was counseled extensively on expectant vs medical vs surgical management for ectopic pregnancy. Risks of expectant management were reviewed including tubal rupture, hemorrhage, emergency surgery, and death. Benefits of medical management were reviewed including avoiding  the inherent risks of surgery and anesthesia. Risks of medical management with methotrexate were reviewed including possible rupture of ectopic, possible need for re-dose, and possible need for subsequent surgery discussed. Contraindications were reviewed including absolute (Intrauterine pregnancy, Evidence of immunodeficiency, Moderate to severe anemia, leukopenia, or thrombocytopenia, Sensitivity to methotrexate, Active pulmonary disease, Active peptic ulcer disease, Clinically important hepatic dysfunction, Clinically important renal dysfunction, Breastfeeding, Ruptured ectopic pregnancy, Hemodynamically unstable patient, Inability to participate in follow-up) and relative (Embryonic cardiac activity detected by transvaginal ultrasonography, High initial hCG concentration, Ectopic pregnancy greater than 4 cm in size as imaged by TVUS, Refusal to accept blood transfusion) contraindications. Risks of methotrexate were reviewed including but not limited to GI problems (nausea, vomiting, stomatitis), vaginal spotting, abdominal pain/cramping, elevated liver enzymes, rare incidence of alopecia and pneumonitis. Medical management, compared with surgical management, has a 15-20% lower success rate and requires longer surveillance, more office visits, and phlebotomy. Risks of surgical management were reviewed including overall low risk of complications, most common complications are those related to general anesthesia, small risk of  injury to the bowel, bladder, or a major blood vessel, risk of thermal injury, bleeding from the incisions made in the skin and infection. Effects of future fertility were reviewed with the patient. Patient was counseled that evidence suggests that methotrexate treatment of ectopic pregnancy does not have an adverse effect on subsequent fertility or on ovarian reserve. Patient was also explained that if the other fallopian tube is healthy, a salpingectomy does not affect future fertility.    Patient elected for management with methotrexate. Consents for methotrexate signed with patient with attending at bedside. Chemotherapy drug order form sent  to pharmacy with methotrexate dose calculated based on BSA for patient. Patient was given strict precautions (severe abdominal pain, dizziness, lightheadedness, severe n/v, or any heavy vaginal bleeding >2pads/hour for >2hours) on when to return and educated about the symptoms of tubal rupture and to emphasize the need to seek immediate medical attention if these symptoms occur. Vigorous activity and sexual intercourse recommended to be avoided until confirmation of resolution because of the theoretical risk of inducing rupture of the ectopic pregnancy. Advised to avoid folic acid supplements, foods that contain folic acid, and nonsteroidal antiinflammatory drugs during therapy because these products may decrease the efficacy of methotrexate. Recommended avoidance of narcotic analgesic medications, alcohol, and gas-producing foods are recommended so as not to mask, or be confused with, escalation of symptoms of rupture. Sunlight exposure also should be avoided during treatment to limit the risk of methotrexate dermatitis. Pt understands she cannot attempt to conceive for at least 3 months. Patient instructed on need for follow up of b-hcg on day 4 and day 7 of methotrexate therapy. Once patient receives methotrexate therapy she is cleared for discharge from OBGYN perspective.  Primary management per ED team.

## 2025-04-11 NOTE — CONSULT NOTE ADULT - ATTENDING COMMENTS
- vital signs stable   - pain resolved   - abdominal exam benign   - discussed R/B of MTX vs surgery, discussed factors that could make MTX less likely to succeed (in her case HCG of 12k), discussed risk of rupture despite MTX management, discussed risk of needing repeat MTX management, she verbalized understanding and desire for MTX management   - discussed importance of follow up in office for Day 4 & 7 HCG levels, she is agreeable to comply, also has appt with GYN tomorrow AM   - discussed RTC pain precautions   - reviewed MTX side effects and precautions sheet and consent obtained   - order to be placed for MTX then discharge home   f/u with Dr. Guerrier tomorrow in office

## 2025-04-12 LAB
CULTURE RESULTS: SIGNIFICANT CHANGE UP
SPECIMEN SOURCE: SIGNIFICANT CHANGE UP

## 2025-04-13 ENCOUNTER — EMERGENCY (EMERGENCY)
Facility: HOSPITAL | Age: 30
LOS: 0 days | Discharge: HOME | End: 2025-04-13
Attending: EMERGENCY MEDICINE
Payer: COMMERCIAL

## 2025-04-13 VITALS
SYSTOLIC BLOOD PRESSURE: 132 MMHG | OXYGEN SATURATION: 98 % | RESPIRATION RATE: 18 BRPM | HEART RATE: 72 BPM | TEMPERATURE: 98 F | DIASTOLIC BLOOD PRESSURE: 77 MMHG

## 2025-04-13 VITALS — WEIGHT: 150.13 LBS | HEIGHT: 64 IN

## 2025-04-13 DIAGNOSIS — Z88.8 ALLERGY STATUS TO OTHER DRUGS, MEDICAMENTS AND BIOLOGICAL SUBSTANCES: ICD-10-CM

## 2025-04-13 DIAGNOSIS — O99.891 OTHER SPECIFIED DISEASES AND CONDITIONS COMPLICATING PREGNANCY: ICD-10-CM

## 2025-04-13 DIAGNOSIS — N83.201 UNSPECIFIED OVARIAN CYST, RIGHT SIDE: ICD-10-CM

## 2025-04-13 DIAGNOSIS — O99.341 OTHER MENTAL DISORDERS COMPLICATING PREGNANCY, FIRST TRIMESTER: ICD-10-CM

## 2025-04-13 DIAGNOSIS — R11.0 NAUSEA: ICD-10-CM

## 2025-04-13 DIAGNOSIS — O99.281 ENDOCRINE, NUTRITIONAL AND METABOLIC DISEASES COMPLICATING PREGNANCY, FIRST TRIMESTER: ICD-10-CM

## 2025-04-13 DIAGNOSIS — E05.90 THYROTOXICOSIS, UNSPECIFIED WITHOUT THYROTOXIC CRISIS OR STORM: ICD-10-CM

## 2025-04-13 DIAGNOSIS — E83.119 HEMOCHROMATOSIS, UNSPECIFIED: ICD-10-CM

## 2025-04-13 DIAGNOSIS — R10.31 RIGHT LOWER QUADRANT PAIN: ICD-10-CM

## 2025-04-13 DIAGNOSIS — O00.90 UNSPECIFIED ECTOPIC PREGNANCY WITHOUT INTRAUTERINE PREGNANCY: ICD-10-CM

## 2025-04-13 DIAGNOSIS — Z91.018 ALLERGY TO OTHER FOODS: ICD-10-CM

## 2025-04-13 LAB
ALBUMIN SERPL ELPH-MCNC: 3.5 G/DL — SIGNIFICANT CHANGE UP (ref 3.3–5)
ALP SERPL-CCNC: 79 U/L — SIGNIFICANT CHANGE UP (ref 40–120)
ALT FLD-CCNC: 20 U/L — SIGNIFICANT CHANGE UP (ref 12–78)
ANION GAP SERPL CALC-SCNC: 3 MMOL/L — LOW (ref 5–17)
APPEARANCE UR: ABNORMAL
APTT BLD: 30.8 SEC — SIGNIFICANT CHANGE UP (ref 24.5–35.6)
AST SERPL-CCNC: 21 U/L — SIGNIFICANT CHANGE UP (ref 15–37)
BACTERIA # UR AUTO: ABNORMAL /HPF
BASOPHILS # BLD AUTO: 0.04 K/UL — SIGNIFICANT CHANGE UP (ref 0–0.2)
BASOPHILS NFR BLD AUTO: 0.4 % — SIGNIFICANT CHANGE UP (ref 0–2)
BILIRUB SERPL-MCNC: 0.5 MG/DL — SIGNIFICANT CHANGE UP (ref 0.2–1.2)
BILIRUB UR-MCNC: NEGATIVE — SIGNIFICANT CHANGE UP
BLD GP AB SCN SERPL QL: SIGNIFICANT CHANGE UP
BUN SERPL-MCNC: 11 MG/DL — SIGNIFICANT CHANGE UP (ref 7–23)
CALCIUM SERPL-MCNC: 9.1 MG/DL — SIGNIFICANT CHANGE UP (ref 8.5–10.1)
CAST: 0 /LPF — SIGNIFICANT CHANGE UP (ref 0–4)
CHLORIDE SERPL-SCNC: 108 MMOL/L — SIGNIFICANT CHANGE UP (ref 96–108)
CO2 SERPL-SCNC: 26 MMOL/L — SIGNIFICANT CHANGE UP (ref 22–31)
COLOR SPEC: YELLOW — SIGNIFICANT CHANGE UP
COMMENT - URINE: SIGNIFICANT CHANGE UP
CREAT SERPL-MCNC: 1.03 MG/DL — SIGNIFICANT CHANGE UP (ref 0.5–1.3)
DIFF PNL FLD: NEGATIVE — SIGNIFICANT CHANGE UP
EGFR: 75 ML/MIN/1.73M2 — SIGNIFICANT CHANGE UP
EGFR: 75 ML/MIN/1.73M2 — SIGNIFICANT CHANGE UP
EOSINOPHIL # BLD AUTO: 0.1 K/UL — SIGNIFICANT CHANGE UP (ref 0–0.5)
EOSINOPHIL NFR BLD AUTO: 1 % — SIGNIFICANT CHANGE UP (ref 0–6)
EPI CELLS # UR: PRESENT
GLUCOSE SERPL-MCNC: 92 MG/DL — SIGNIFICANT CHANGE UP (ref 70–99)
GLUCOSE UR QL: NEGATIVE MG/DL — SIGNIFICANT CHANGE UP
HCG SERPL-ACNC: HIGH MIU/ML
HCT VFR BLD CALC: 36.6 % — SIGNIFICANT CHANGE UP (ref 34.5–45)
HGB BLD-MCNC: 12.4 G/DL — SIGNIFICANT CHANGE UP (ref 11.5–15.5)
IMM GRANULOCYTES # BLD AUTO: 0.03 K/UL — SIGNIFICANT CHANGE UP (ref 0–0.07)
IMM GRANULOCYTES NFR BLD AUTO: 0.3 % — SIGNIFICANT CHANGE UP (ref 0–0.9)
INR BLD: 0.94 RATIO — SIGNIFICANT CHANGE UP (ref 0.85–1.16)
KETONES UR-MCNC: ABNORMAL MG/DL
LEUKOCYTE ESTERASE UR-ACNC: ABNORMAL
LYMPHOCYTES # BLD AUTO: 1.68 K/UL — SIGNIFICANT CHANGE UP (ref 1–3.3)
LYMPHOCYTES NFR BLD AUTO: 16.9 % — SIGNIFICANT CHANGE UP (ref 13–44)
MCHC RBC-ENTMCNC: 31.2 PG — SIGNIFICANT CHANGE UP (ref 27–34)
MCHC RBC-ENTMCNC: 33.9 G/DL — SIGNIFICANT CHANGE UP (ref 32–36)
MCV RBC AUTO: 92 FL — SIGNIFICANT CHANGE UP (ref 80–100)
MONOCYTES # BLD AUTO: 0.26 K/UL — SIGNIFICANT CHANGE UP (ref 0–0.9)
MONOCYTES NFR BLD AUTO: 2.6 % — SIGNIFICANT CHANGE UP (ref 2–14)
NEUTROPHILS # BLD AUTO: 7.86 K/UL — HIGH (ref 1.8–7.4)
NEUTROPHILS NFR BLD AUTO: 78.8 % — HIGH (ref 43–77)
NITRITE UR-MCNC: NEGATIVE — SIGNIFICANT CHANGE UP
NRBC # BLD AUTO: 0 K/UL — SIGNIFICANT CHANGE UP (ref 0–0)
NRBC # FLD: 0 K/UL — SIGNIFICANT CHANGE UP (ref 0–0)
NRBC BLD AUTO-RTO: 0 /100 WBCS — SIGNIFICANT CHANGE UP (ref 0–0)
PH UR: 6.5 — SIGNIFICANT CHANGE UP (ref 5–8)
PLATELET # BLD AUTO: 264 K/UL — SIGNIFICANT CHANGE UP (ref 150–400)
PMV BLD: 10.8 FL — SIGNIFICANT CHANGE UP (ref 7–13)
POTASSIUM SERPL-MCNC: 4 MMOL/L — SIGNIFICANT CHANGE UP (ref 3.5–5.3)
POTASSIUM SERPL-SCNC: 4 MMOL/L — SIGNIFICANT CHANGE UP (ref 3.5–5.3)
PROT SERPL-MCNC: 7 GM/DL — SIGNIFICANT CHANGE UP (ref 6–8.3)
PROT UR-MCNC: NEGATIVE MG/DL — SIGNIFICANT CHANGE UP
PROTHROM AB SERPL-ACNC: 11.1 SEC — SIGNIFICANT CHANGE UP (ref 9.9–13.4)
RBC # BLD: 3.98 M/UL — SIGNIFICANT CHANGE UP (ref 3.8–5.2)
RBC # FLD: 12.2 % — SIGNIFICANT CHANGE UP (ref 10.3–14.5)
RBC CASTS # UR COMP ASSIST: 16 /HPF — HIGH (ref 0–4)
SODIUM SERPL-SCNC: 137 MMOL/L — SIGNIFICANT CHANGE UP (ref 135–145)
SP GR SPEC: 1.01 — SIGNIFICANT CHANGE UP (ref 1–1.03)
SQUAMOUS # UR AUTO: 20 /HPF — HIGH (ref 0–5)
UROBILINOGEN FLD QL: 0.2 MG/DL — SIGNIFICANT CHANGE UP (ref 0.2–1)
WBC # BLD: 9.97 K/UL — SIGNIFICANT CHANGE UP (ref 3.8–10.5)
WBC # FLD AUTO: 9.97 K/UL — SIGNIFICANT CHANGE UP (ref 3.8–10.5)
WBC UR QL: 2 /HPF — SIGNIFICANT CHANGE UP (ref 0–5)

## 2025-04-13 PROCEDURE — 93005 ELECTROCARDIOGRAM TRACING: CPT

## 2025-04-13 PROCEDURE — 96401 CHEMO ANTI-NEOPL SQ/IM: CPT

## 2025-04-13 PROCEDURE — 76830 TRANSVAGINAL US NON-OB: CPT | Mod: 26

## 2025-04-13 PROCEDURE — 80053 COMPREHEN METABOLIC PANEL: CPT

## 2025-04-13 PROCEDURE — 84702 CHORIONIC GONADOTROPIN TEST: CPT

## 2025-04-13 PROCEDURE — 81001 URINALYSIS AUTO W/SCOPE: CPT

## 2025-04-13 PROCEDURE — 93010 ELECTROCARDIOGRAM REPORT: CPT

## 2025-04-13 PROCEDURE — 85730 THROMBOPLASTIN TIME PARTIAL: CPT

## 2025-04-13 PROCEDURE — 86900 BLOOD TYPING SEROLOGIC ABO: CPT

## 2025-04-13 PROCEDURE — 99285 EMERGENCY DEPT VISIT HI MDM: CPT

## 2025-04-13 PROCEDURE — 86850 RBC ANTIBODY SCREEN: CPT

## 2025-04-13 PROCEDURE — 85610 PROTHROMBIN TIME: CPT

## 2025-04-13 PROCEDURE — 99285 EMERGENCY DEPT VISIT HI MDM: CPT | Mod: 25

## 2025-04-13 PROCEDURE — 76830 TRANSVAGINAL US NON-OB: CPT

## 2025-04-13 PROCEDURE — 85025 COMPLETE CBC W/AUTO DIFF WBC: CPT

## 2025-04-13 PROCEDURE — 96374 THER/PROPH/DIAG INJ IV PUSH: CPT

## 2025-04-13 PROCEDURE — 86901 BLOOD TYPING SEROLOGIC RH(D): CPT

## 2025-04-13 PROCEDURE — 36415 COLL VENOUS BLD VENIPUNCTURE: CPT

## 2025-04-13 RX ORDER — METHOTREXATE 25 MG/ML
86 INJECTION, SOLUTION INTRA-ARTERIAL; INTRAMUSCULAR; INTRATHECAL; INTRAVENOUS ONCE
Refills: 0 | Status: COMPLETED | OUTPATIENT
Start: 2025-04-13 | End: 2025-04-13

## 2025-04-13 RX ORDER — ACETAMINOPHEN 500 MG/5ML
1000 LIQUID (ML) ORAL ONCE
Refills: 0 | Status: COMPLETED | OUTPATIENT
Start: 2025-04-13 | End: 2025-04-13

## 2025-04-13 RX ORDER — METHOTREXATE 25 MG/ML
85 INJECTION, SOLUTION INTRA-ARTERIAL; INTRAMUSCULAR; INTRATHECAL; INTRAVENOUS ONCE
Refills: 0 | Status: DISCONTINUED | OUTPATIENT
Start: 2025-04-13 | End: 2025-04-13

## 2025-04-13 RX ADMIN — Medication 400 MILLIGRAM(S): at 08:40

## 2025-04-13 RX ADMIN — Medication 1000 MILLILITER(S): at 08:29

## 2025-04-13 RX ADMIN — METHOTREXATE 86 MILLIGRAM(S): 25 INJECTION, SOLUTION INTRA-ARTERIAL; INTRAMUSCULAR; INTRATHECAL; INTRAVENOUS at 13:31

## 2025-04-13 NOTE — ED PROVIDER NOTE - PHYSICAL EXAMINATION
Gen:  Well appearing in NAD  Head:  NC/AT  HEENT: pupils perrl,no pharyngeal erythema, uvula midline  Cardiac: S1S2, RRR  Abd: +RLQ TTP  Resp: No distress, CTA   musculoskeletal:: no deformities, no swelling, strength +5/+5  Skin: warm and dry as visualized, no rashes  Neuro: BRENNAN, aao x 4  Psych:alert, cooperative, appropriate mood and affect for situation

## 2025-04-13 NOTE — ED ADULT TRIAGE NOTE - CHIEF COMPLAINT QUOTE
Pt ambulatory to ED c/o nausea and R sided lower abdominal pain. Pt reports that she was dx with an ectopic pregnancy on Thursday and was told to follow up on Sunday by her OBGYN Dr. Guerrier to get repeat US and labs for potential surgery. Pt endorses that she is currently fasted. Pt denies any bleeding at this time. Respirations even and unlabored, no distress noted at this time.

## 2025-04-13 NOTE — ED PROVIDER NOTE - OBJECTIVE STATEMENT
Pt is a 29y female  8 weeks, 3 days gestation w/ a PMHx of anxiety, hemochromatosis, and hyperthyroidism who presents to the ED for R sided lower abdominal pain and nausea. Patient was seen at [facility] on  regarding these symptoms, was found to have an R tubal unruptured ectopic pregnancy on US, was given a dose of methotrexate by OBGYN. Today the patient was instructed to follow up to obtain repeat US and labs for potential surgery by her outpatient OBGYN Chey. Patient notes that she has fasted today. Denies vaginal bleeding.

## 2025-04-13 NOTE — ED ADULT NURSE REASSESSMENT NOTE - NS ED NURSE REASSESS COMMENT FT1
Spoke with OB resident MD Paris in regards to the administration of Methotrexate. Patient received her first dose of Methotrexate on 4/11/25 at 0128 at [facility]. Resident Paris aware as per the policy that the second dose of Methotrexate is to be given on day 4. Was advised by the resident that it is appropriate to administer the Methotrexate now. Safety maintained.

## 2025-04-13 NOTE — CONSULT NOTE ADULT - ASSESSMENT
A/P: RAMON MIRANDA is a 29y  @ 3w4d by LMP with ultrasound confirmed ectopic pregnancy s/p MTX#1 on 25 at [facility] presenting today for her D4 HCG.    - VS wnl, Hb 12.4, abdomen benign  - TVUS showing stable ectopic pregnancy without evidence of rupture  - HCG 12013 (D1) > 10890 (D4)  - Due to high starting HCG level, patient is at high risk for failure of methotrexate. Patient counselled on recommendation for surgery, however opts for repeat dose of methotrexate. She is to return on D7 to the ED for repeat HCG and repeat TVUS. Ectopic return precautions given. Patient aware to call doctor's office and/or return to the ED sooner if any concerns.   - R/B/A again reviewed. Side effects of methotrexate again discussed. Methotrexate patient information sheet provided.     D/w Dr. Guerrier

## 2025-04-13 NOTE — ED PROVIDER NOTE - CARE PROVIDER_API CALL
Koki Guerrier  Obstetrics and Gynecology  [address] [place] [id number]  Phone: [phone]  Fax: [phone]  Follow Up Time:

## 2025-04-13 NOTE — ED PROVIDER NOTE - CLINICAL SUMMARY MEDICAL DECISION MAKING FREE TEXT BOX
29y female  p/w RLQ TTP in the setting of known ectopic pregnancy. Will consult with YONIS Guerrier.

## 2025-04-13 NOTE — ED ADULT NURSE NOTE - OBJECTIVE STATEMENT
pt presenting to the ed C/O R sided abdominal pain SIB Dr. Fisher for r/o ruptured ectopic pregnancy. Pt was given one dose of methotrexate x this wk. Pt endorsing nausea, denies chest discomfort/SOB.

## 2025-04-13 NOTE — ED PROVIDER NOTE - PATIENT PORTAL LINK FT
You can access the FollowMyHealth Patient Portal offered by [facility] by registering at the following website: [email or web address]. By joining [facility]’s FollowMyHealth portal, you will also be able to view your health information using other applications (apps) compatible with our system.

## 2025-04-13 NOTE — ED ADULT NURSE NOTE - NSFALLUNIVINTERV_ED_ALL_ED
Bed/Stretcher in lowest position, wheels locked, appropriate side rails in place/Call bell, personal items and telephone in reach/Instruct patient to call for assistance before getting out of bed/chair/stretcher/Non-slip footwear applied when patient is off stretcher/[place] to call system/Physically safe environment - no spills, clutter or unnecessary equipment/Purposeful proactive rounding/Room/bathroom lighting operational, light cord in reach

## 2025-04-13 NOTE — ED ADULT NURSE REASSESSMENT NOTE - NS ED NURSE REASSESS COMMENT FT1
I personally spoke to pt OBGYN, Dr. Guerrier in regards to Methotrexate administration. Confirmed hospital policy for second dose of methotrexate on day 4 in which administration today would be 12 early, per Dr. Guerrier pt to be given methotrexate NOW, aware of policy. x2 RN instructed to administrate methotrexate per Dr. Guerrier and Dr. Paris, while both aware of policy. RN notified administration Latisha of circumstances, per Latisha, "as long as OBGYN states pt can be given medication, pt is ok to receive medication." Admin, Charge RN, Dr. Guerrier and Dr. Paris aware.

## 2025-04-13 NOTE — ED ADULT TRIAGE NOTE - NS ED TRIAGE AVPU SCALE
Patient is presenting to the Emergency Department with a request to have COVID-19 testing.  Denies symptoms, including cough, shortness of breath, fever, chills, bodyaches or sore throat. Alert-The patient is alert, awake and responds to voice. The patient is oriented to time, place, and person. The triage nurse is able to obtain subjective information.

## 2025-04-13 NOTE — ED PROVIDER NOTE - NSFOLLOWUPINSTRUCTIONS_ED_ALL_ED_FT
please follow up with Dr. Doan    Ectopic Pregnancy    WHAT YOU NEED TO KNOW:    What is an ectopic pregnancy? Ectopic pregnancy occurs when a fertilized egg attaches and begins to grow outside of the uterus. The most common place for this to happen is in the fallopian tube. This is sometimes called a tubal pregnancy. The egg can also implant on the outside of the uterus, on the ovary or cervix, or in the abdomen. The egg may begin to grow, but the pregnancy cannot continue normally. Ectopic pregnancy can cause heavy bleeding and may be life-threatening.    What increases my risk for an ectopic pregnancy?    Pelvic inflammatory disease (PID) or infections, such as chlamydia    A past ectopic pregnancy, or past fertility problems    Fallopian tube injury or damage    Getting pregnant when you have an intrauterine device (IUD)    Past surgery in your abdomen or on your reproductive organs    Medicines to treat infertility or that contain female hormones    Certain fertility treatments, such as multiple embryo transplant    Smoking cigarettes    Age older than 35 years  What are the signs and symptoms of ectopic pregnancy?    One-sided abdominal or pelvic pain and cramping    Vaginal bleeding or spotting that happens about 7 weeks after your missed period    Nausea or vomiting    Dizziness, weakness, or fainting    Tissue coming out of your vagina  How is ectopic pregnancy diagnosed? Your healthcare provider will examine you and ask about other medical conditions or surgeries you have had. The provider will ask about pregnancies, miscarriages, infertility treatments, and sexually transmitted infections (STIs) you have had before. You may need any of the following:    A pelvic exam is used to check the size and shape of your uterus, cervix, and ovaries.    Blood and urine tests will show if you are currently pregnant, or if you have infections or other problems.    Ultrasound pictures may be taken of the inside of your uterus, ovaries, and abdomen. An ultrasound is usually done over your abdomen, but you may also need a vaginal ultrasound. During a vaginal ultrasound, a small tube is placed into your vagina. This can help healthcare providers see areas that may be hard to see during an abdominal ultrasound.  How is ectopic pregnancy treated? Your body may absorb the pregnancy tissues and your symptoms may decrease without any treatment. If this does not happen, you may need any of the following:    Medicine called methotrexate may be given to stop the pregnancy. This may be given as an injection. You may need more than one dose. It is important to follow up with your healthcare provider as directed if you receive this medicine.    Surgery may be done to repair or remove tissue or ruptured fallopian tubes. Your healthcare provider will talk to you about possible kinds of surgery. Your provider will consider where the ectopic pregnancy is located and the damage it caused. Talk to your provider about your desire to have children in the future. Some kinds of surgery will prevent future pregnancy.  Where can I get support and more information?    The [employer]  [address] [address]  [place] [address]  Phone: [phone]  Phone: [phone]  Web Address: http://www.[facility] [place] [facility].org  Call your local emergency number (911 in the [place]) if:    You have chest pain or trouble breathing.    Call your doctor if:    You have sharp pain in your lower abdomen that is severe and starts suddenly.    You feel lightheaded or like you are going to faint.    You have increasing abdominal or pelvic pain or heavy vaginal bleeding.    You have shoulder pain.    You have a fever.    You have questions or concerns about your condition or care.  CARE AGREEMENT:    You have the right to help plan your care. Learn about your health condition and how it may be treated. Discuss treatment options with your healthcare providers to decide what care you want to receive. You always have the right to refuse treatment.    © [employer] 1973, 2025    	  back to top            © [employer] 1973, 2025

## 2025-04-13 NOTE — ED PROVIDER NOTE - PROGRESS NOTE DETAILS
Niko Garnica for Dr. Zimmer: Spoke with Chey who recommends that if the beta is >15k and the R adnexal cystic structure is <2cm, will give the 2nd dose of methotrexate. Niko Garnica for Dr. Zimmer: Labs notable for HCG of 10K, US pending. pt able to be dc to fu with Dr. Rachna Lozano DO

## 2025-04-13 NOTE — CONSULT NOTE ADULT - SUBJECTIVE AND OBJECTIVE BOX
RAMON MIRANDA is a 29y  @ 3w4d by LMP with ultrasound confirmed ectopic pregnancy s/p MTX#1 on 25 at [facility] presenting today for her D4 HCG. Patient denies abdominal pain, vaginal bleeding, lightheadedness/dizziness, palpitations. Reports mild nausea/vomiting responsive to zofran.     Outpatient GYN: Dr. Guerrier    POBHx:   PGYNHx: Denies uterine fibroids, STIs. Endorses hx of a left ovarian cyst that was roughly 2cm in diameter two years ago. Hx of abn PAPs but no hx of colpo or LEEP.   PMHx: Anxiety, Hashimoto's Thyroiditis, Asthma, Hereditary hemochromatosis  Meds: Lexapro, Leiothyronine, Levothyroxine, Singulair  All: Duricef - hives  PSHx: Tonsillectomy, Adenoidectomy  Social Hx: Denies      Vitals:   T(C): 36.7 (25 @ 07:46), Max: 36.7 (25 @ 07:46)  HR: 75 (25 @ 07:46) (75 - 75)  BP: 130/68 (25 @ 07:46) (130/68 - 130/68)  RR: 18 (25 @ 07:46) (18 - 18)  SpO2: 100% (25 @ 07:46) (100% - 100%)    General: AAOx3, NAD  Abd: Soft, nontender, non distended; no rebound/guarding  Pelvic: no CMT, no adnexal tenderness    Labs:                        12.4   9.97  )-----------( 264      ( 2025 08:25 )             36.6         137  |  108  |  11  ----------------------------<  92  4.0   |  26  |  1.03    Ca    9.1      2025 08:25    TPro  7.0  /  Alb  3.5  /  TBili  0.5  /  DBili  x   /  AST  21  /  ALT  20  /  AlkPhos  79      SERUM bhcg    41235   @ 08:25      Radiology:  < from: US Transvaginal (25 @ 09:25) >    INTERPRETATION:  CLINICAL INFORMATION: Known ectopic pregnancy.    LMP: 2025    COMPARISON: None available.    TECHNIQUE:  Endovaginal and transabdominal pelvic sonogram. Color and Spectral   Doppler was performed.    FINDINGS:  Uterus: 8.2 cm x 3.7 cm x 5.4 cm. Within normal limits.  Endometrium: 13 mm. Within normal limits.    Right ovary: 2.8 cm x 1.6 cm x 2.1 cm.Within normal limits. Normal   arterial and venous waveforms. Right tubal ectopic pregnancy measuring   2.8 cm x 2.1 cm x 1.8 cm, without a fetal pole, without a yolk sac, and   without cardiac activity. No evidence of rupture. No evidence of   hemorrhage.  Left ovary: 4.4 cm x 3.5 cm x 3.8 cm. Within normal limits. Normal   arterial and venous waveforms. Very large complex cystic structure within   the left ovary, likely hemorrhagic cysts, measures up to 3.5 cm x 3.7 cm   x 2.8 cm.    Fluid: Trace free simple pelvic fluid; presumably physiologic nature.    IMPRESSION:  Evolving, likely right tubal/adnexal, ectopic pregnancy, without evident   complications. Read above text for additional findings, and detailed   explanations.    < end of copied text >

## 2025-04-13 NOTE — CONSULT NOTE ADULT - ATTENDING COMMENTS
Pt seen in office 2 days ago s/p MTX.   Discussed plan of care with patient and  and offered surgery vs. close observation with 2 dose MTX protocol. Pt opted for 2nd dose of MTX and is a candidate for medical management today due to no evidence of rupture on ultrasound today and decreasing HCG level.   Agree with plan of care.   Will follow up in 3 days in the ED for repeat HCG and ultrasound.   Strict preacutions given to return to ED sooner for pelvic pain or any other concerns.   Discussed plan of care via telephone with patient.

## 2025-04-14 ENCOUNTER — INPATIENT (INPATIENT)
Facility: HOSPITAL | Age: 30
LOS: 0 days | Discharge: HOME | DRG: 819 | End: 2025-04-14
Attending: OBSTETRICS & GYNECOLOGY | Admitting: OBSTETRICS & GYNECOLOGY
Payer: COMMERCIAL

## 2025-04-14 ENCOUNTER — TRANSCRIPTION ENCOUNTER (OUTPATIENT)
Age: 30
End: 2025-04-14

## 2025-04-14 VITALS
TEMPERATURE: 98 F | OXYGEN SATURATION: 97 % | SYSTOLIC BLOOD PRESSURE: 119 MMHG | DIASTOLIC BLOOD PRESSURE: 74 MMHG | HEART RATE: 99 BPM | RESPIRATION RATE: 16 BRPM

## 2025-04-14 VITALS — HEIGHT: 60 IN | WEIGHT: 145.06 LBS

## 2025-04-14 DIAGNOSIS — O00.90 UNSPECIFIED ECTOPIC PREGNANCY WITHOUT INTRAUTERINE PREGNANCY: ICD-10-CM

## 2025-04-14 LAB
ALBUMIN SERPL ELPH-MCNC: 3.6 G/DL — SIGNIFICANT CHANGE UP (ref 3.3–5)
ALP SERPL-CCNC: 76 U/L — SIGNIFICANT CHANGE UP (ref 40–120)
ALT FLD-CCNC: 105 U/L — HIGH (ref 12–78)
ANION GAP SERPL CALC-SCNC: 6 MMOL/L — SIGNIFICANT CHANGE UP (ref 5–17)
AST SERPL-CCNC: 139 U/L — HIGH (ref 15–37)
BASOPHILS # BLD AUTO: 0.04 K/UL — SIGNIFICANT CHANGE UP (ref 0–0.2)
BASOPHILS NFR BLD AUTO: 0.4 % — SIGNIFICANT CHANGE UP (ref 0–2)
BILIRUB SERPL-MCNC: 0.8 MG/DL — SIGNIFICANT CHANGE UP (ref 0.2–1.2)
BUN SERPL-MCNC: 8 MG/DL — SIGNIFICANT CHANGE UP (ref 7–23)
CALCIUM SERPL-MCNC: 9.4 MG/DL — SIGNIFICANT CHANGE UP (ref 8.5–10.1)
CHLORIDE SERPL-SCNC: 107 MMOL/L — SIGNIFICANT CHANGE UP (ref 96–108)
CO2 SERPL-SCNC: 22 MMOL/L — SIGNIFICANT CHANGE UP (ref 22–31)
CREAT SERPL-MCNC: 0.81 MG/DL — SIGNIFICANT CHANGE UP (ref 0.5–1.3)
EGFR: 99 ML/MIN/1.73M2 — SIGNIFICANT CHANGE UP
EGFR: 99 ML/MIN/1.73M2 — SIGNIFICANT CHANGE UP
EOSINOPHIL # BLD AUTO: 0.06 K/UL — SIGNIFICANT CHANGE UP (ref 0–0.5)
EOSINOPHIL NFR BLD AUTO: 0.6 % — SIGNIFICANT CHANGE UP (ref 0–6)
GLUCOSE SERPL-MCNC: 101 MG/DL — HIGH (ref 70–99)
HCG SERPL-ACNC: 5572 MIU/ML — HIGH
HCT VFR BLD CALC: 36.1 % — SIGNIFICANT CHANGE UP (ref 34.5–45)
HGB BLD-MCNC: 12.2 G/DL — SIGNIFICANT CHANGE UP (ref 11.5–15.5)
IMM GRANULOCYTES # BLD AUTO: 0.03 K/UL — SIGNIFICANT CHANGE UP (ref 0–0.07)
IMM GRANULOCYTES NFR BLD AUTO: 0.3 % — SIGNIFICANT CHANGE UP (ref 0–0.9)
LYMPHOCYTES # BLD AUTO: 0.93 K/UL — LOW (ref 1–3.3)
LYMPHOCYTES NFR BLD AUTO: 8.8 % — LOW (ref 13–44)
MCHC RBC-ENTMCNC: 31.6 PG — SIGNIFICANT CHANGE UP (ref 27–34)
MCHC RBC-ENTMCNC: 33.8 G/DL — SIGNIFICANT CHANGE UP (ref 32–36)
MCV RBC AUTO: 93.5 FL — SIGNIFICANT CHANGE UP (ref 80–100)
MONOCYTES # BLD AUTO: 0.12 K/UL — SIGNIFICANT CHANGE UP (ref 0–0.9)
MONOCYTES NFR BLD AUTO: 1.1 % — LOW (ref 2–14)
NEUTROPHILS # BLD AUTO: 9.43 K/UL — HIGH (ref 1.8–7.4)
NEUTROPHILS NFR BLD AUTO: 88.8 % — HIGH (ref 43–77)
NRBC # BLD AUTO: 0 K/UL — SIGNIFICANT CHANGE UP (ref 0–0)
NRBC # FLD: 0 K/UL — SIGNIFICANT CHANGE UP (ref 0–0)
NRBC BLD AUTO-RTO: 0 /100 WBCS — SIGNIFICANT CHANGE UP (ref 0–0)
PLATELET # BLD AUTO: 280 K/UL — SIGNIFICANT CHANGE UP (ref 150–400)
PMV BLD: 11.2 FL — SIGNIFICANT CHANGE UP (ref 7–13)
POTASSIUM SERPL-MCNC: 4.7 MMOL/L — SIGNIFICANT CHANGE UP (ref 3.5–5.3)
POTASSIUM SERPL-SCNC: 4.7 MMOL/L — SIGNIFICANT CHANGE UP (ref 3.5–5.3)
PROT SERPL-MCNC: 7.2 GM/DL — SIGNIFICANT CHANGE UP (ref 6–8.3)
RBC # BLD: 3.86 M/UL — SIGNIFICANT CHANGE UP (ref 3.8–5.2)
RBC # FLD: 12.4 % — SIGNIFICANT CHANGE UP (ref 10.3–14.5)
SODIUM SERPL-SCNC: 135 MMOL/L — SIGNIFICANT CHANGE UP (ref 135–145)
WBC # BLD: 10.61 K/UL — HIGH (ref 3.8–10.5)
WBC # FLD AUTO: 10.61 K/UL — HIGH (ref 3.8–10.5)

## 2025-04-14 PROCEDURE — 86923 COMPATIBILITY TEST ELECTRIC: CPT

## 2025-04-14 PROCEDURE — 76830 TRANSVAGINAL US NON-OB: CPT | Mod: 26

## 2025-04-14 PROCEDURE — C9399: CPT

## 2025-04-14 PROCEDURE — 88305 TISSUE EXAM BY PATHOLOGIST: CPT

## 2025-04-14 PROCEDURE — 76830 TRANSVAGINAL US NON-OB: CPT

## 2025-04-14 PROCEDURE — 99285 EMERGENCY DEPT VISIT HI MDM: CPT

## 2025-04-14 PROCEDURE — 88305 TISSUE EXAM BY PATHOLOGIST: CPT | Mod: 26

## 2025-04-14 RX ORDER — OXYCODONE HYDROCHLORIDE 30 MG/1
5 TABLET ORAL ONCE
Refills: 0 | Status: DISCONTINUED | OUTPATIENT
Start: 2025-04-14 | End: 2025-04-14

## 2025-04-14 RX ORDER — ONDANSETRON HCL/PF 4 MG/2 ML
4 VIAL (ML) INJECTION ONCE
Refills: 0 | Status: COMPLETED | OUTPATIENT
Start: 2025-04-14 | End: 2025-04-14

## 2025-04-14 RX ORDER — HYDROMORPHONE/SOD CHLOR,ISO/PF 2 MG/10 ML
1 SYRINGE (ML) INJECTION
Refills: 0 | Status: DISCONTINUED | OUTPATIENT
Start: 2025-04-14 | End: 2025-04-14

## 2025-04-14 RX ORDER — OXYCODONE HYDROCHLORIDE 30 MG/1
10 TABLET ORAL ONCE
Refills: 0 | Status: DISCONTINUED | OUTPATIENT
Start: 2025-04-14 | End: 2025-04-14

## 2025-04-14 RX ORDER — HYDROMORPHONE/SOD CHLOR,ISO/PF 2 MG/10 ML
0.5 SYRINGE (ML) INJECTION
Refills: 0 | Status: DISCONTINUED | OUTPATIENT
Start: 2025-04-14 | End: 2025-04-14

## 2025-04-14 RX ORDER — SODIUM CHLORIDE 9 G/1000ML
1000 INJECTION, SOLUTION INTRAVENOUS
Refills: 0 | Status: DISCONTINUED | OUTPATIENT
Start: 2025-04-14 | End: 2025-04-14

## 2025-04-14 RX ADMIN — Medication 0.5 MILLIGRAM(S): at 15:12

## 2025-04-14 RX ADMIN — OXYCODONE HYDROCHLORIDE 10 MILLIGRAM(S): 30 TABLET ORAL at 15:10

## 2025-04-14 RX ADMIN — Medication 4 MILLIGRAM(S): at 10:48

## 2025-04-14 RX ADMIN — Medication 1000 MILLILITER(S): at 10:48

## 2025-04-14 NOTE — ED ADULT TRIAGE NOTE - CHIEF COMPLAINT QUOTE
pt presented to er c/o pelvic pain beginning this morning. pt had ectopic pregnancy, received second dose of methotrexate yesterday. endorsing nausea and bloating. took tylenol prior to arrival without relief. -vomiting, fevers.

## 2025-04-14 NOTE — ED STATDOCS - OBJECTIVE STATEMENT
30 y/o F, , with no PMHX presents to ED c/o 8/10 pelvic pressure and pain and nausea beginning 1 hour PTA. Pt has ectopic pregnancy, received second dose of methotrexate yesterday. No vaginal bleeding.  Allergy to Duricef.

## 2025-04-14 NOTE — ED ADULT NURSE NOTE - OBJECTIVE STATEMENT
Pt c/o lower abd pain started  this morning .pt is very uncomfortable. pt was in the ED yesterday was diagnose with ectopic pregnancy and released after receiving methotrexate.denies vaginal bleeding.

## 2025-04-14 NOTE — H&P ADULT - HISTORY OF PRESENT ILLNESS
RAMON MIRANDA is a 29y  @ 3w5d by LMP with ultrasound confirmed ectopic pregnancy s/p MTX#1 on 25 at [facility] and MTX#2 25 presenting today for acute worsening of abdominal pain with associated nausea/vomiting. Denies lightheadedness/dizziness, palpitations    Outpatient GYN: Dr. Guerrier    POBHx:   PGYNHx: Denies uterine fibroids, STIs. Endorses hx of a left ovarian cyst that was roughly 2cm in diameter two years ago. Hx of abn PAPs but no hx of colpo or LEEP.   PMHx: Anxiety, Hashimoto's Thyroiditis, Asthma, Hereditary hemochromatosis  Meds: Lexapro, Leiothyronine, Levothyroxine, Singulair  All: Duricef - hives  PSHx: Tonsillectomy, Adenoidectomy  Social Hx: Denies      Vitals:   T(C): 36.7 (25 @ 07:46), Max: 36.7 (25 @ 07:46)  HR: 75 (25 @ 07:46) (75 - 75)  BP: 130/68 (25 @ 07:46) (130/68 - 130/68)  RR: 18 (25 @ 07:46) (18 - 18)  SpO2: 100% (25 @ 07:46) (100% - 100%)    General: AAOx3, NAD  Abd: Soft, mild diffuse lower abdominal tenderness, significant right adnexal tenderness, no rebound/guarding  Pelvic: deferred for OR    Labs:                        12.4   9.97  )-----------( 264      ( 2025 08:25 )             36.6         137  |  108  |  11  ----------------------------<  92  4.0   |  26  |  1.03    Ca    9.1      2025 08:25    TPro  7.0  /  Alb  3.5  /  TBili  0.5  /  DBili  x   /  AST  21  /  ALT  20  /  AlkPhos  79  04-    SERUM bhcg    42700   @ 08:25      Radiology: pending

## 2025-04-14 NOTE — ASU DISCHARGE PLAN (ADULT/PEDIATRIC) - PROCEDURE
s/p diagnostic laparoscopy due to c/f ruptured ectopic pregnancy s/p laparoscopic right salpingectomy due to ruptured ectopic pregnancy

## 2025-04-14 NOTE — H&P ADULT - ASSESSMENT
RAMON MIRANDA is a 29y  @ 3w5d by LMP with ultrasound confirmed ectopic pregnancy s/p MTX#1 on 25 at [facility] and MTX#2 25 presenting today for acute worsening of abdominal pain with associated nausea/vomiting.    - Given significant abdominal pain and patient's high risk of rupture (high starting HCG), strong suspicion for ruptured ectopic pregnancy  - TVUS pending, not yet performed. Will move forward with admission and OR booking for diagnostic laparoscopy. R/B/A explained to patient, consents obtained.  - NPO, pre op labs    D/w Dr. Guerrier

## 2025-04-14 NOTE — ASU DISCHARGE PLAN (ADULT/PEDIATRIC) - FINANCIAL ASSISTANCE
[facility] provides services at a reduced cost to those who are determined to be eligible through [facility]’s financial assistance program. Information regarding [facility]’s financial assistance program can be found by going to [email or web address] or by calling [phone].

## 2025-04-14 NOTE — ASU DISCHARGE PLAN (ADULT/PEDIATRIC) - CALL YOUR DOCTOR IF YOU HAVE ANY OF THE FOLLOWING:
100.4F/Bleeding that does not stop/Swelling that gets worse/Pain not relieved by Medications/Fever greater than (need to indicate Fahrenheit or Celsius)/Wound/Surgical Site with redness, or foul smelling discharge or pus/Nausea and vomiting that does not stop/Unable to urinate/Inability to tolerate liquids or foods

## 2025-04-14 NOTE — ED STATDOCS - PROGRESS NOTE DETAILS
29-year-old female , recently diagnosed with a right-sided ectopic pregnancy and received her second dose of methotrexate yesterday presents with severe lower abdominal pain that started this morning approximately 1 hour prior to arrival.  Patient states that she been feeling nauseous with her abdominal pain.  Patient took Tylenol at approximately 9 AM this morning without relief.  Patient was told to come back to the emergency room if she was developing any worsening pain.  Moderate TTP to the right lower quadrant with mild TTP to the suprapubic and left lower quadrant.  Will obtain labs, repeat sono, morphine and Zofran for pain and/nausea and consult GYN. -Howard Gómez PA-C Discussed case with GYN resident Misty. Pending labs, sono and will come evaluate patient. -Howard Gómez PA-C Norris with GYN resident. Pt to be admitted to Dr Guerrier. -Howard Gómez PA-C

## 2025-04-14 NOTE — ASU DISCHARGE PLAN (ADULT/PEDIATRIC) - CARE PROVIDER_API CALL
Koki Guerrier  Obstetrics and Gynecology  [address] [place] [id number]  Phone: [phone]  Fax: [phone]  Follow Up Time: 2 weeks

## 2025-04-14 NOTE — ED STATDOCS - CLINICAL SUMMARY MEDICAL DECISION MAKING FREE TEXT BOX
30 y/o F,  , with abd pain after 2nd dose of methotrexate for ectopic pregnancy. Check labs, pain meds, consult OBGYN and check US.

## 2025-04-14 NOTE — ED STATDOCS - ATTENDING APP SHARED VISIT CONTRIBUTION OF CARE
I personally saw the patient with the IAN, and completed the key components of the history and physical exam. I then discussed the management plan with the IAN.

## 2025-04-16 LAB — SURGICAL PATHOLOGY STUDY: SIGNIFICANT CHANGE UP

## 2025-04-19 DIAGNOSIS — F41.9 ANXIETY DISORDER, UNSPECIFIED: ICD-10-CM

## 2025-04-19 DIAGNOSIS — O99.281 ENDOCRINE, NUTRITIONAL AND METABOLIC DISEASES COMPLICATING PREGNANCY, FIRST TRIMESTER: ICD-10-CM

## 2025-04-19 DIAGNOSIS — O99.341 OTHER MENTAL DISORDERS COMPLICATING PREGNANCY, FIRST TRIMESTER: ICD-10-CM

## 2025-04-19 DIAGNOSIS — Z3A.01 LESS THAN 8 WEEKS GESTATION OF PREGNANCY: ICD-10-CM

## 2025-04-19 DIAGNOSIS — Z91.018 ALLERGY TO OTHER FOODS: ICD-10-CM

## 2025-04-19 DIAGNOSIS — E06.3 AUTOIMMUNE THYROIDITIS: ICD-10-CM

## 2025-04-19 DIAGNOSIS — Z88.8 ALLERGY STATUS TO OTHER DRUGS, MEDICAMENTS AND BIOLOGICAL SUBSTANCES: ICD-10-CM

## 2025-04-19 DIAGNOSIS — O99.511 DISEASES OF THE RESPIRATORY SYSTEM COMPLICATING PREGNANCY, FIRST TRIMESTER: ICD-10-CM

## 2025-04-19 DIAGNOSIS — J45.909 UNSPECIFIED ASTHMA, UNCOMPLICATED: ICD-10-CM

## 2025-04-28 DIAGNOSIS — G89.29 OTHER CHRONIC PAIN: ICD-10-CM
